# Patient Record
Sex: MALE | Race: WHITE | NOT HISPANIC OR LATINO | Employment: UNEMPLOYED | URBAN - METROPOLITAN AREA
[De-identification: names, ages, dates, MRNs, and addresses within clinical notes are randomized per-mention and may not be internally consistent; named-entity substitution may affect disease eponyms.]

---

## 2024-11-04 ENCOUNTER — APPOINTMENT (OUTPATIENT)
Dept: OTOLARYNGOLOGY | Facility: CLINIC | Age: 49
End: 2024-11-04
Payer: COMMERCIAL

## 2024-11-04 VITALS — WEIGHT: 209.4 LBS | HEIGHT: 73 IN | TEMPERATURE: 97.6 F | BODY MASS INDEX: 27.75 KG/M2

## 2024-11-04 DIAGNOSIS — L98.9 BENIGN SKIN LESION OF NECK: ICD-10-CM

## 2024-11-04 PROCEDURE — 3008F BODY MASS INDEX DOCD: CPT | Performed by: STUDENT IN AN ORGANIZED HEALTH CARE EDUCATION/TRAINING PROGRAM

## 2024-11-04 PROCEDURE — 99204 OFFICE O/P NEW MOD 45 MIN: CPT | Performed by: STUDENT IN AN ORGANIZED HEALTH CARE EDUCATION/TRAINING PROGRAM

## 2024-11-04 ASSESSMENT — PATIENT HEALTH QUESTIONNAIRE - PHQ9
1. LITTLE INTEREST OR PLEASURE IN DOING THINGS: NOT AT ALL
SUM OF ALL RESPONSES TO PHQ9 QUESTIONS 1 AND 2: 0
2. FEELING DOWN, DEPRESSED OR HOPELESS: NOT AT ALL

## 2024-11-04 ASSESSMENT — PAIN SCALES - GENERAL: PAINLEVEL_OUTOF10: 4

## 2024-11-04 NOTE — PROGRESS NOTES
"HEAD AND NECK SURGERY CONSULT  [facility]    HPI  I had the pleasure of seeing Bill Marsh as a consultation today for evaluation of left neck lesion.     The patient reports a history of a lump on his left lower neck. It has been there for many years, has not grown, no pain. No other lumps or bumps. He has had this excised twice and it keeps coming back. Not recently infected. No throat pain, dysphagia, dysphonia, dyspnea or weight loss.    The patient denies having prior trauma or surgery to their head and neck. There is not a personal or family history of blood clots, easy bleeding or bruising, or anesthesia concerns.     Tobacco use: The patient  reports that he has been smoking cigarettes. He does not have any smokeless tobacco history on file.   Alcohol Use: The patient  has no history on file for alcohol use.     Physical Examination  Vitals:  Temp 36.4 °C (97.6 °F) (Temporal)   Ht 1.854 m (6' 1\")   Wt 95 kg (209 lb 6.4 oz)   BMI 27.63 kg/m²   General: Examination reveals a well-developed, well-nourished patient in no apparent distress.  The patient has no audible dysphonia, stridor or airway distress.  The patient is oriented, alert and responsive.    Oral Cavity:  The patient is able to open the mouth widely without trismus.  The floor of mouth and oral tongue are soft, and no mucosal abnormalities are noted on the lips or within the oral cavity.  The oral tongue is fully mobile and midline on protrusion.  The patient's teeth are intact  Oropharynx: There are no mucosal abnormalities noted within the oropharynx.  The soft palate elevates symmetrically, the tonsils and base of tongue are normal to inspection and palpation.       Salivary glands: There are no palpable masses of the parotid or submandibular glands.   Neuro: Cranial nerves 2-12 are without obvious abnormality.  Neck: The neck is soft and symmetric.  There is no palpable adenopathy. 1-2 cm palpable, mobile, nontender benign " appearing cyst left lower neck, likely EIC     ASSESSMENT and PLAN:    Neck cyst  - recommend removal of the entire cyst with the capsule. Discussed the possibility of doing this in clinic, although I am not fully set up with cautery  - Will proceed to operating room to fully excise and prevent recurrence. This is likely benign and can be done at the patient's convenience. Risks and benefits of excision discussed including pain, bleeding, infection, scar, damage to surrounding tissue, need for further procedures, risks of anesthesia  - Questions answered and he would like to proceed    Carlee Avilez MD     Glycopyrrolate Pregnancy And Lactation Text: This medication is Pregnancy Category B and is considered safe during pregnancy. It is unknown if it is excreted breast milk.

## 2024-11-04 NOTE — H&P (VIEW-ONLY)
"HEAD AND NECK SURGERY CONSULT  [facility]    HPI  I had the pleasure of seeing Bill Marsh as a consultation today for evaluation of left neck lesion.     The patient reports a history of a lump on his left lower neck. It has been there for many years, has not grown, no pain. No other lumps or bumps. He has had this excised twice and it keeps coming back. Not recently infected. No throat pain, dysphagia, dysphonia, dyspnea or weight loss.    The patient denies having prior trauma or surgery to their head and neck. There is not a personal or family history of blood clots, easy bleeding or bruising, or anesthesia concerns.     Tobacco use: The patient  reports that he has been smoking cigarettes. He does not have any smokeless tobacco history on file.   Alcohol Use: The patient  has no history on file for alcohol use.     Physical Examination  Vitals:  Temp 36.4 °C (97.6 °F) (Temporal)   Ht 1.854 m (6' 1\")   Wt 95 kg (209 lb 6.4 oz)   BMI 27.63 kg/m²   General: Examination reveals a well-developed, well-nourished patient in no apparent distress.  The patient has no audible dysphonia, stridor or airway distress.  The patient is oriented, alert and responsive.    Oral Cavity:  The patient is able to open the mouth widely without trismus.  The floor of mouth and oral tongue are soft, and no mucosal abnormalities are noted on the lips or within the oral cavity.  The oral tongue is fully mobile and midline on protrusion.  The patient's teeth are intact  Oropharynx: There are no mucosal abnormalities noted within the oropharynx.  The soft palate elevates symmetrically, the tonsils and base of tongue are normal to inspection and palpation.       Salivary glands: There are no palpable masses of the parotid or submandibular glands.   Neuro: Cranial nerves 2-12 are without obvious abnormality.  Neck: The neck is soft and symmetric.  There is no palpable adenopathy. 1-2 cm palpable, mobile, nontender benign " appearing cyst left lower neck, likely EIC     ASSESSMENT and PLAN:    Neck cyst  - recommend removal of the entire cyst with the capsule. Discussed the possibility of doing this in clinic, although I am not fully set up with cautery  - Will proceed to operating room to fully excise and prevent recurrence. This is likely benign and can be done at the patient's convenience. Risks and benefits of excision discussed including pain, bleeding, infection, scar, damage to surrounding tissue, need for further procedures, risks of anesthesia  - Questions answered and he would like to proceed    Carlee Avilez MD

## 2024-12-03 ENCOUNTER — ANESTHESIA EVENT (OUTPATIENT)
Dept: OPERATING ROOM | Facility: HOSPITAL | Age: 49
End: 2024-12-03
Payer: COMMERCIAL

## 2024-12-04 ENCOUNTER — HOSPITAL ENCOUNTER (OUTPATIENT)
Facility: HOSPITAL | Age: 49
Setting detail: OUTPATIENT SURGERY
Discharge: HOME | End: 2024-12-04
Attending: STUDENT IN AN ORGANIZED HEALTH CARE EDUCATION/TRAINING PROGRAM | Admitting: STUDENT IN AN ORGANIZED HEALTH CARE EDUCATION/TRAINING PROGRAM
Payer: COMMERCIAL

## 2024-12-04 ENCOUNTER — ANESTHESIA (OUTPATIENT)
Dept: OPERATING ROOM | Facility: HOSPITAL | Age: 49
End: 2024-12-04
Payer: COMMERCIAL

## 2024-12-04 VITALS
BODY MASS INDEX: 27.28 KG/M2 | TEMPERATURE: 97.3 F | OXYGEN SATURATION: 95 % | DIASTOLIC BLOOD PRESSURE: 87 MMHG | SYSTOLIC BLOOD PRESSURE: 152 MMHG | WEIGHT: 206.79 LBS | RESPIRATION RATE: 16 BRPM | HEART RATE: 90 BPM

## 2024-12-04 DIAGNOSIS — G89.18 POST-OP PAIN: Primary | ICD-10-CM

## 2024-12-04 DIAGNOSIS — L98.9 BENIGN SKIN LESION OF NECK: ICD-10-CM

## 2024-12-04 PROBLEM — K21.9 GASTROESOPHAGEAL REFLUX DISEASE: Status: ACTIVE | Noted: 2024-12-04

## 2024-12-04 PROBLEM — E78.5 HYPERLIPIDEMIA: Status: ACTIVE | Noted: 2024-12-04

## 2024-12-04 LAB
ABO GROUP (TYPE) IN BLOOD: NORMAL
ANTIBODY SCREEN: NORMAL
RH FACTOR (ANTIGEN D): NORMAL

## 2024-12-04 PROCEDURE — 3700000002 HC GENERAL ANESTHESIA TIME - EACH INCREMENTAL 1 MINUTE: Performed by: STUDENT IN AN ORGANIZED HEALTH CARE EDUCATION/TRAINING PROGRAM

## 2024-12-04 PROCEDURE — 86901 BLOOD TYPING SEROLOGIC RH(D): CPT | Performed by: ANESTHESIOLOGY

## 2024-12-04 PROCEDURE — 2500000004 HC RX 250 GENERAL PHARMACY W/ HCPCS (ALT 636 FOR OP/ED): Performed by: STUDENT IN AN ORGANIZED HEALTH CARE EDUCATION/TRAINING PROGRAM

## 2024-12-04 PROCEDURE — 86850 RBC ANTIBODY SCREEN: CPT | Performed by: ANESTHESIOLOGY

## 2024-12-04 PROCEDURE — 2720000007 HC OR 272 NO HCPCS: Performed by: STUDENT IN AN ORGANIZED HEALTH CARE EDUCATION/TRAINING PROGRAM

## 2024-12-04 PROCEDURE — 3600000003 HC OR TIME - INITIAL BASE CHARGE - PROCEDURE LEVEL THREE: Performed by: STUDENT IN AN ORGANIZED HEALTH CARE EDUCATION/TRAINING PROGRAM

## 2024-12-04 PROCEDURE — 7100000002 HC RECOVERY ROOM TIME - EACH INCREMENTAL 1 MINUTE: Performed by: STUDENT IN AN ORGANIZED HEALTH CARE EDUCATION/TRAINING PROGRAM

## 2024-12-04 PROCEDURE — 88304 TISSUE EXAM BY PATHOLOGIST: CPT | Performed by: STUDENT IN AN ORGANIZED HEALTH CARE EDUCATION/TRAINING PROGRAM

## 2024-12-04 PROCEDURE — 7100000009 HC PHASE TWO TIME - INITIAL BASE CHARGE: Performed by: STUDENT IN AN ORGANIZED HEALTH CARE EDUCATION/TRAINING PROGRAM

## 2024-12-04 PROCEDURE — 2500000005 HC RX 250 GENERAL PHARMACY W/O HCPCS: Performed by: STUDENT IN AN ORGANIZED HEALTH CARE EDUCATION/TRAINING PROGRAM

## 2024-12-04 PROCEDURE — 3700000001 HC GENERAL ANESTHESIA TIME - INITIAL BASE CHARGE: Performed by: STUDENT IN AN ORGANIZED HEALTH CARE EDUCATION/TRAINING PROGRAM

## 2024-12-04 PROCEDURE — 3600000008 HC OR TIME - EACH INCREMENTAL 1 MINUTE - PROCEDURE LEVEL THREE: Performed by: STUDENT IN AN ORGANIZED HEALTH CARE EDUCATION/TRAINING PROGRAM

## 2024-12-04 PROCEDURE — 7100000010 HC PHASE TWO TIME - EACH INCREMENTAL 1 MINUTE: Performed by: STUDENT IN AN ORGANIZED HEALTH CARE EDUCATION/TRAINING PROGRAM

## 2024-12-04 PROCEDURE — 88304 TISSUE EXAM BY PATHOLOGIST: CPT | Mod: TC,SUR | Performed by: STUDENT IN AN ORGANIZED HEALTH CARE EDUCATION/TRAINING PROGRAM

## 2024-12-04 PROCEDURE — 7100000001 HC RECOVERY ROOM TIME - INITIAL BASE CHARGE: Performed by: STUDENT IN AN ORGANIZED HEALTH CARE EDUCATION/TRAINING PROGRAM

## 2024-12-04 PROCEDURE — A21555 PR EXC TUMOR SOFT TISSUE NECK/ANT THORAX SUBQ <3CM: Performed by: ANESTHESIOLOGY

## 2024-12-04 PROCEDURE — 11420 EXC H-F-NK-SP B9+MARG 0.5/<: CPT | Performed by: STUDENT IN AN ORGANIZED HEALTH CARE EDUCATION/TRAINING PROGRAM

## 2024-12-04 PROCEDURE — 2500000001 HC RX 250 WO HCPCS SELF ADMINISTERED DRUGS (ALT 637 FOR MEDICARE OP): Performed by: STUDENT IN AN ORGANIZED HEALTH CARE EDUCATION/TRAINING PROGRAM

## 2024-12-04 PROCEDURE — 36415 COLL VENOUS BLD VENIPUNCTURE: CPT | Performed by: ANESTHESIOLOGY

## 2024-12-04 RX ORDER — ONDANSETRON HYDROCHLORIDE 2 MG/ML
INJECTION, SOLUTION INTRAVENOUS AS NEEDED
Status: DISCONTINUED | OUTPATIENT
Start: 2024-12-04 | End: 2024-12-04

## 2024-12-04 RX ORDER — LIDOCAINE HYDROCHLORIDE 40 MG/ML
SOLUTION TOPICAL AS NEEDED
Status: DISCONTINUED | OUTPATIENT
Start: 2024-12-04 | End: 2024-12-04

## 2024-12-04 RX ORDER — HYDROMORPHONE HYDROCHLORIDE 0.2 MG/ML
0.4 INJECTION INTRAMUSCULAR; INTRAVENOUS; SUBCUTANEOUS EVERY 5 MIN PRN
Status: DISCONTINUED | OUTPATIENT
Start: 2024-12-04 | End: 2024-12-04 | Stop reason: HOSPADM

## 2024-12-04 RX ORDER — SODIUM CHLORIDE 0.9 G/100ML
IRRIGANT IRRIGATION AS NEEDED
Status: DISCONTINUED | OUTPATIENT
Start: 2024-12-04 | End: 2024-12-04 | Stop reason: HOSPADM

## 2024-12-04 RX ORDER — OXYCODONE HYDROCHLORIDE 5 MG/1
5 TABLET ORAL EVERY 6 HOURS PRN
Qty: 8 TABLET | Refills: 0 | Status: SHIPPED | OUTPATIENT
Start: 2024-12-04 | End: 2024-12-07

## 2024-12-04 RX ORDER — FENTANYL CITRATE 50 UG/ML
INJECTION, SOLUTION INTRAMUSCULAR; INTRAVENOUS AS NEEDED
Status: DISCONTINUED | OUTPATIENT
Start: 2024-12-04 | End: 2024-12-04

## 2024-12-04 RX ORDER — MIDAZOLAM HYDROCHLORIDE 1 MG/ML
INJECTION INTRAMUSCULAR; INTRAVENOUS AS NEEDED
Status: DISCONTINUED | OUTPATIENT
Start: 2024-12-04 | End: 2024-12-04

## 2024-12-04 RX ORDER — SODIUM CHLORIDE, SODIUM LACTATE, POTASSIUM CHLORIDE, CALCIUM CHLORIDE 600; 310; 30; 20 MG/100ML; MG/100ML; MG/100ML; MG/100ML
10 INJECTION, SOLUTION INTRAVENOUS CONTINUOUS
Status: DISCONTINUED | OUTPATIENT
Start: 2024-12-04 | End: 2024-12-04 | Stop reason: HOSPADM

## 2024-12-04 RX ORDER — HYDROMORPHONE HYDROCHLORIDE 0.2 MG/ML
0.2 INJECTION INTRAMUSCULAR; INTRAVENOUS; SUBCUTANEOUS EVERY 5 MIN PRN
Status: DISCONTINUED | OUTPATIENT
Start: 2024-12-04 | End: 2024-12-04 | Stop reason: HOSPADM

## 2024-12-04 RX ORDER — LIDOCAINE HYDROCHLORIDE AND EPINEPHRINE 10; 10 UG/ML; MG/ML
INJECTION, SOLUTION INFILTRATION; PERINEURAL AS NEEDED
Status: DISCONTINUED | OUTPATIENT
Start: 2024-12-04 | End: 2024-12-04 | Stop reason: HOSPADM

## 2024-12-04 RX ORDER — ONDANSETRON HYDROCHLORIDE 2 MG/ML
4 INJECTION, SOLUTION INTRAVENOUS ONCE AS NEEDED
Status: DISCONTINUED | OUTPATIENT
Start: 2024-12-04 | End: 2024-12-04 | Stop reason: HOSPADM

## 2024-12-04 RX ORDER — OXYCODONE HYDROCHLORIDE 5 MG/1
5 TABLET ORAL EVERY 4 HOURS PRN
Status: DISCONTINUED | OUTPATIENT
Start: 2024-12-04 | End: 2024-12-04 | Stop reason: HOSPADM

## 2024-12-04 RX ORDER — MONTELUKAST SODIUM 10 MG/1
10 TABLET ORAL NIGHTLY
COMMUNITY

## 2024-12-04 RX ORDER — PANTOPRAZOLE SODIUM 40 MG/1
40 TABLET, DELAYED RELEASE ORAL
COMMUNITY

## 2024-12-04 RX ORDER — LIDOCAINE HYDROCHLORIDE 10 MG/ML
0.1 INJECTION, SOLUTION INFILTRATION; PERINEURAL ONCE
Status: DISCONTINUED | OUTPATIENT
Start: 2024-12-04 | End: 2024-12-04 | Stop reason: HOSPADM

## 2024-12-04 RX ORDER — PROPOFOL 10 MG/ML
INJECTION, EMULSION INTRAVENOUS AS NEEDED
Status: DISCONTINUED | OUTPATIENT
Start: 2024-12-04 | End: 2024-12-04

## 2024-12-04 RX ORDER — ALBUTEROL SULFATE 0.83 MG/ML
2.5 SOLUTION RESPIRATORY (INHALATION) ONCE AS NEEDED
Status: DISCONTINUED | OUTPATIENT
Start: 2024-12-04 | End: 2024-12-04 | Stop reason: HOSPADM

## 2024-12-04 RX ORDER — LABETALOL HYDROCHLORIDE 5 MG/ML
5 INJECTION, SOLUTION INTRAVENOUS ONCE AS NEEDED
Status: DISCONTINUED | OUTPATIENT
Start: 2024-12-04 | End: 2024-12-04 | Stop reason: HOSPADM

## 2024-12-04 RX ORDER — TESTOSTERONE CYPIONATE 1000 MG/10ML
200 INJECTION, SOLUTION INTRAMUSCULAR
COMMUNITY

## 2024-12-04 RX ORDER — CEFAZOLIN 1 G/1
INJECTION, POWDER, FOR SOLUTION INTRAVENOUS AS NEEDED
Status: DISCONTINUED | OUTPATIENT
Start: 2024-12-04 | End: 2024-12-04

## 2024-12-04 RX ORDER — ATORVASTATIN CALCIUM 10 MG/1
10 TABLET, FILM COATED ORAL DAILY
COMMUNITY

## 2024-12-04 SDOH — HEALTH STABILITY: MENTAL HEALTH: CURRENT SMOKER: 1

## 2024-12-04 ASSESSMENT — PAIN DESCRIPTION - LOCATION: LOCATION: NECK

## 2024-12-04 ASSESSMENT — PAIN - FUNCTIONAL ASSESSMENT
PAIN_FUNCTIONAL_ASSESSMENT: 0-10

## 2024-12-04 ASSESSMENT — PAIN SCALES - GENERAL
PAINLEVEL_OUTOF10: 7
PAINLEVEL_OUTOF10: 0 - NO PAIN

## 2024-12-04 NOTE — PERIOPERATIVE NURSING NOTE
0910- Patient refusing pain medications in the PACU. Patient states he wants no meds in recovery and wants to go home and take his prescription at home.

## 2024-12-04 NOTE — ANESTHESIA PREPROCEDURE EVALUATION
Patient: Bill Marsh    Procedure Information       Date/Time: 12/04/24 0800    Procedure: Excision Lesion Neck (Left)    Location: [facility] OR 04 / [facility] OR    Surgeons: Carlee Avilez MD            Relevant Problems   Anesthesia (within normal limits)      Cardiac   (+) Hyperlipidemia      Pulmonary  smoker      Neuro (within normal limits)      GI   (+) Gastroesophageal reflux disease      /Renal (within normal limits)      Liver (within normal limits)      Endocrine (within normal limits)      Hematology (within normal limits)      Musculoskeletal (within normal limits)       Clinical information reviewed:    Allergies  Meds               NPO Detail:  NPO/Void Status  Carbohydrate Drink Given Prior to Surgery? : N  Date of Last Liquid: 12/03/24  Time of Last Liquid: 2000  Date of Last Solid: 12/03/24  Time of Last Solid: 2000  Last Intake Type: Clear fluids  Time of Last Void: 0600         Physical Exam    Airway  Mallampati: III  TM distance: >3 FB     Cardiovascular   Rhythm: regular     Dental    Pulmonary   Breath sounds clear to auscultation     Abdominal            Anesthesia Plan    History of general anesthesia?: yes  History of complications of general anesthesia?: no    ASA 2     general     The patient is a current smoker.  Patient was not previously instructed to abstain from smoking on day of procedure.    intravenous induction   Anesthetic plan and risks discussed with patient.  Use of blood products discussed with patient who consented to blood products.

## 2024-12-04 NOTE — DISCHARGE INSTRUCTIONS
During office hours between 8 AM and 5 PM, please call your doctors office at [phone]  If you have an emergency over night or on the weekend, please call [phone] and ask the  to connect you to the ENT resident on call.

## 2024-12-04 NOTE — ANESTHESIA POSTPROCEDURE EVALUATION
Patient: Bill Marsh    Procedure Summary       Date: 12/04/24 Room / Location: [facility] OR 04 / [facility] [place]    Anesthesia Start: 0818 Anesthesia Stop: 0900    Procedure: Excision Lesion Neck (Left: Neck) Diagnosis:       Benign skin lesion of neck      (Benign skin lesion of neck [L98.9])    Surgeons: Carlee Avilez MD Responsible Provider: Mallory Hernandez MD    Anesthesia Type: general ASA Status: 2            Anesthesia Type: general    Vitals Value Taken Time   /79 12/04/24 0901   Temp 36.9 12/04/24 0901   Pulse 94 12/04/24 0901   Resp 13 12/04/24 0901   SpO2 95 12/04/24 0901       Anesthesia Post Evaluation    Patient location during evaluation: PACU  Patient participation: complete - patient participated  Level of consciousness: awake and alert  Pain management: adequate  Airway patency: patent  Cardiovascular status: acceptable  Respiratory status: acceptable and face mask  Hydration status: acceptable  Postoperative Nausea and Vomiting: none        No notable events documented.

## 2024-12-04 NOTE — ANESTHESIA PROCEDURE NOTES
Airway  Date/Time: 12/4/2024 8:24 AM  Urgency: elective    Airway not difficult    Staffing  Performed: resident   Authorized by: Mallory Hernandez MD    Performed by: Dickson Rollins DO  Patient location during procedure: OR    Indications and Patient Condition  Indications for airway management: anesthesia  Spontaneous Ventilation: absent  Sedation level: deep  Preoxygenated: yes  Mask difficulty assessment: 1 - vent by mask    Final Airway Details  Final airway type: supraglottic airway      Successful airway: ProSeal  Size 5

## 2024-12-04 NOTE — OP NOTE
Excision Lesion Neck (L) Operative Note     Date: 2024  OR Location: [facility] OR    Name: Bill Marsh, : 1975, Age: 49 y.o., MRN: [id number], Sex: male    Diagnosis  Pre-op Diagnosis      * Benign skin lesion of neck [L98.9] Post-op Diagnosis     * Benign skin lesion of neck [L98.9]     Procedures  Excision Lesion Neck  53372 - IL EXC B9 LESION MRGN XCP SK TG S/N/H/F/G 0.5 CM/<      Surgeons      * Carlee Avilez - Primary    Resident/Fellow/Other Assistant:  Surgeons and Role:  * No surgeons found with a matching role *    Staff:   Circulator: Kelli  Circulator: Celine Rodriguez Person: Jemima Rodriguez Person: Nella    Anesthesia Staff: Anesthesiologist: Mallory Hernandez MD  Anesthesia Resident: Dickson Rollins DO    Procedure Summary  Anesthesia: General  ASA: II  Estimated Blood Loss: 5mL  Intra-op Medications:   Administrations occurring from 0800 to 0945 on 24:   Medication Name Total Dose   lidocaine-epinephrine (Xylocaine W/EPI) 1 %-1:100,000 injection 10 mL   sodium chloride 0.9 % irrigation solution 1,000 mL   ceFAZolin (Ancef) 1 g 2 g   dexAMETHasone (Decadron) injection 4 mg/mL 4 mg   fentaNYL (Sublimaze) injection 50 mcg/mL 100 mcg   LR bolus Cannot be calculated   lidocaine (LTA Kit) for intubation 4 mL   midazolam PF (Versed) injection 1 mg/mL 2 mg   ondansetron (Zofran) 2 mg/mL injection 4 mg   oxyCODONE (Roxicodone) immediate release tablet 5 mg 5 mg   oxygen (O2) therapy 30 L   propofol (Diprivan) injection 10 mg/mL 290 mg              Anesthesia Record               Intraprocedure I/O Totals          Intake    LR bolus 500.00 mL    Total Intake 500 mL          Specimen:   ID Type Source Tests Collected by Time   1 : L neck mass Tissue SOFT TISSUE RESECTION SURGICAL PATHOLOGY EXAM Carlee Avilez MD 2024 0846                 Drains and/or Catheters: * None in log *    Tourniquet Times:         Implants:     Findings: benign superficial cyst    Indications: Bill Marsh is an  49 y.o. male who is having surgery for Benign skin lesion of neck [L98.9].     The patient was seen in the preoperative area. The risks, benefits, complications, treatment options, non-operative alternatives, expected recovery and outcomes were discussed with the patient. The possibilities of reaction to medication, pulmonary aspiration, injury to surrounding structures, bleeding, recurrent infection, the need for additional procedures, failure to diagnose a condition, and creating a complication requiring transfusion or operation were discussed with the patient. The patient concurred with the proposed plan, giving informed consent.  The site of surgery was properly noted/marked if necessary per policy. The patient has been actively warmed in preoperative area. Preoperative antibiotics have been ordered and given within 1 hours of incision. Venous thrombosis prophylaxis have been ordered including bilateral sequential compression devices    Procedure Details:   The patient was identified and time out performed. Anesthesia was induced in the usual fashion. An incision as planned over the palpable lesion and injected with  1;100,000 lidocaine with epinephrine. The patient was prepped and draped in the usual sterile fashion. The incision was incised with a 15 blade and carried deep with monopolar cautery. The cyst was fully dissected free and removed en bloc. The area was irrigated and hemostasis achieved. The incision was closed in layers with 4-0 monocryl and steri strip placed. The patient was returned to anaesthesia for awakening.     Complications:  None; patient tolerated the procedure well.    Disposition: PACU - hemodynamically stable.  Condition: stable         Attending Attestation: I performed the procedure.    Carlee Avilez  Phone Number: [phone]

## 2024-12-16 ENCOUNTER — APPOINTMENT (OUTPATIENT)
Dept: OTOLARYNGOLOGY | Facility: CLINIC | Age: 49
End: 2024-12-16
Payer: COMMERCIAL

## 2024-12-16 LAB
LABORATORY COMMENT REPORT: NORMAL
PATH REPORT.FINAL DX SPEC: NORMAL
PATH REPORT.GROSS SPEC: NORMAL
PATH REPORT.RELEVANT HX SPEC: NORMAL
PATH REPORT.TOTAL CANCER: NORMAL

## (undated) DEVICE — BOWL, UTILITY, 32 OZ, PLASTIC, STERILE

## (undated) DEVICE — NEEDLE, HYPODERMIC, 25 G X 1.5 IN, A BEVEL, STERILE

## (undated) DEVICE — Device

## (undated) DEVICE — REST, HEAD, BAGEL, 9 IN

## (undated) DEVICE — COVER, CART, 45 X 27 X 48 IN, CLEAR

## (undated) DEVICE — MANIFOLD, 4 PORT NEPTUNE STANDARD